# Patient Record
Sex: FEMALE | Race: WHITE | URBAN - METROPOLITAN AREA
[De-identification: names, ages, dates, MRNs, and addresses within clinical notes are randomized per-mention and may not be internally consistent; named-entity substitution may affect disease eponyms.]

---

## 2017-12-14 ENCOUNTER — OFFICE VISIT (OUTPATIENT)
Dept: OBGYN CLINIC | Age: 32
End: 2017-12-14
Payer: COMMERCIAL

## 2017-12-14 VITALS
SYSTOLIC BLOOD PRESSURE: 112 MMHG | BODY MASS INDEX: 35.61 KG/M2 | HEIGHT: 63 IN | WEIGHT: 201 LBS | DIASTOLIC BLOOD PRESSURE: 70 MMHG

## 2017-12-14 DIAGNOSIS — Z01.419 ENCOUNTER FOR GYNECOLOGICAL EXAMINATION: Primary | ICD-10-CM

## 2017-12-14 PROBLEM — Z97.5 IUD (INTRAUTERINE DEVICE) IN PLACE: Status: ACTIVE | Noted: 2017-12-14

## 2017-12-14 PROCEDURE — 99395 PREV VISIT EST AGE 18-39: CPT | Performed by: OBSTETRICS & GYNECOLOGY

## 2017-12-14 NOTE — PROGRESS NOTES
Vaginal Discharge  Musculoskeletal ROS: No Arthralgia, Arthritis,Gout,Osteoporosis or Rheumatism  Neurological ROS: No CVA, Migraines, Epilepsy, Seizure Hx, or Limb Weakness  Dermatological ROS: No Rash, Itching, Hives, Mole Changes or Cancer                                                                                                                                                                                                                                PHYSICAL Exam:     Constitutional:  Blood pressure 112/70, height 5' 3\" (1.6 m), weight 201 lb (91.2 kg), not currently breastfeeding. General Appearance: This  is a well Developed, well Nourished, well groomed female. Her BMI was reviewed. Nutritional decision making was discussed. Skin:  There was a Normal Inspection of the skin without rashes or lesions. There were no rashes. (Papular, Maculopapular, Hives, Pustular, Macular)     There were no lesions (Ulcers, Erythema, Abn. Appearing Nevi)        Lymphatic:  No Lymph Nodes were Palpable in the neck , axilla or groin.  0 # Of Lymph Nodes; Location ; Character [Normal]  [Shotty] [Tender] [Enlarged]     Neck and EENT:  The neck was supple. There were no masses   The thyroid was not enlarged and had no masses. Perrla, EOMI B/L, TMI B/L No Abnormalities. Throat inspected-No exudates or Masses, Nares Patent No Birgit        Respiratory: The lungs were auscultated and found to be clear. There were no rales, rhonchi or wheezes. There was a good respiratory effort. Cardiovascular: The heart was in a regular rate and rhythm. . No S3 or S4. There was no murmur appreciated. Location, grade, and radiation are not applicable. Extremities: The patients extremities were without calf tenderness, edema, or varicosities. There was full range of motion in all four extremities. Pulses in all four extremities were appreciated and are 2/4. Abdomen: The abdomen was soft and non-tender.  There were Completed:  See pt instructions           PLAN:  Repeat pap every 1 year if negative. Mammograms every 1 year. If 35 yo and last mammogram was negative. Calcium and Vitamin D dosing reviewed. Colonoscopy screening reviewed as well as onset for bone density testing. Routine health maintenance per patients PCP.

## 2017-12-15 ENCOUNTER — HOSPITAL ENCOUNTER (OUTPATIENT)
Age: 32
Setting detail: SPECIMEN
Discharge: HOME | End: 2017-12-15
Payer: COMMERCIAL

## 2017-12-19 LAB
HPV SAMPLE: NORMAL
HPV SOURCE: NORMAL
HPV, GENOTYPE 16: NOT DETECTED
HPV, GENOTYPE 18: NOT DETECTED
HPV, HIGH RISK OTHER: NOT DETECTED
HPV, INTERPRETATION: NORMAL

## 2017-12-28 LAB — CYTOLOGY REPORT: NORMAL

## 2023-04-28 ENCOUNTER — HOSPITAL ENCOUNTER (EMERGENCY)
Age: 38
Discharge: HOME | End: 2023-04-28
Attending: STUDENT IN AN ORGANIZED HEALTH CARE EDUCATION/TRAINING PROGRAM
Payer: OTHER MISCELLANEOUS

## 2023-04-28 VITALS
HEART RATE: 75 BPM | WEIGHT: 150 LBS | HEIGHT: 62 IN | BODY MASS INDEX: 27.6 KG/M2 | RESPIRATION RATE: 18 BRPM | SYSTOLIC BLOOD PRESSURE: 118 MMHG | OXYGEN SATURATION: 100 % | DIASTOLIC BLOOD PRESSURE: 85 MMHG
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
## 2023-04-28 PROCEDURE — 6370000000 HC RX 637 (ALT 250 FOR IP): Performed by: STUDENT IN AN ORGANIZED HEALTH CARE EDUCATION/TRAINING PROGRAM

## 2023-04-28 PROCEDURE — 99283 EMERGENCY DEPT VISIT LOW MDM: CPT

## 2023-04-28 RX ORDER — IBUPROFEN 800 MG/1
800 TABLET ORAL ONCE
Status: COMPLETED | OUTPATIENT
Start: 2023-04-28 | End: 2023-04-28

## 2023-04-28 RX ADMIN — IBUPROFEN 800 MG: 800 TABLET ORAL at 19:27

## 2023-04-28 ASSESSMENT — PAIN SCALES - GENERAL
PAINLEVEL_OUTOF10: 6
PAINLEVEL_OUTOF10: 3

## 2023-04-28 ASSESSMENT — PAIN DESCRIPTION - LOCATION: LOCATION: HEAD

## 2023-04-28 ASSESSMENT — PAIN - FUNCTIONAL ASSESSMENT: PAIN_FUNCTIONAL_ASSESSMENT: 0-10

## 2023-04-28 ASSESSMENT — PAIN DESCRIPTION - ORIENTATION: ORIENTATION: RIGHT
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
## 2023-04-29 ASSESSMENT — ENCOUNTER SYMPTOMS
COUGH: 0
ABDOMINAL PAIN: 0
NAUSEA: 0
SHORTNESS OF BREATH: 0
BACK PAIN: 0
VOMITING: 0